# Patient Record
Sex: FEMALE | Race: WHITE | NOT HISPANIC OR LATINO
[De-identification: names, ages, dates, MRNs, and addresses within clinical notes are randomized per-mention and may not be internally consistent; named-entity substitution may affect disease eponyms.]

---

## 2020-09-08 ENCOUNTER — RESULT REVIEW (OUTPATIENT)
Age: 25
End: 2020-09-08

## 2020-12-22 PROBLEM — Z00.00 ENCOUNTER FOR PREVENTIVE HEALTH EXAMINATION: Status: ACTIVE | Noted: 2020-12-22

## 2021-01-05 ENCOUNTER — APPOINTMENT (OUTPATIENT)
Dept: SURGERY | Facility: CLINIC | Age: 26
End: 2021-01-05
Payer: COMMERCIAL

## 2021-01-05 VITALS
HEART RATE: 86 BPM | WEIGHT: 115 LBS | TEMPERATURE: 97.5 F | SYSTOLIC BLOOD PRESSURE: 124 MMHG | RESPIRATION RATE: 16 BRPM | BODY MASS INDEX: 24.14 KG/M2 | OXYGEN SATURATION: 99 % | DIASTOLIC BLOOD PRESSURE: 74 MMHG | HEIGHT: 58 IN

## 2021-01-05 DIAGNOSIS — Z78.9 OTHER SPECIFIED HEALTH STATUS: ICD-10-CM

## 2021-01-05 DIAGNOSIS — F17.200 NICOTINE DEPENDENCE, UNSPECIFIED, UNCOMPLICATED: ICD-10-CM

## 2021-01-05 PROCEDURE — 99243 OFF/OP CNSLTJ NEW/EST LOW 30: CPT

## 2021-01-05 PROCEDURE — 99072 ADDL SUPL MATRL&STAF TM PHE: CPT

## 2021-01-05 RX ORDER — NORETHINDRONE ACETATE AND ETHINYL ESTRADIOL 1MG-20(24)
1-20 KIT ORAL
Qty: 84 | Refills: 0 | Status: DISCONTINUED | COMMUNITY
Start: 2020-11-25

## 2021-01-05 RX ORDER — IPRATROPIUM BROMIDE 21 UG/1
0.03 SPRAY NASAL
Qty: 30 | Refills: 0 | Status: DISCONTINUED | COMMUNITY
Start: 2020-12-28

## 2021-01-05 NOTE — CONSULT LETTER
[Dear  ___] : Dear  [unfilled], [Consult Letter:] : I had the pleasure of evaluating your patient, [unfilled]. [Please see my note below.] : Please see my note below. [Consult Closing:] : Thank you very much for allowing me to participate in the care of this patient.  If you have any questions, please do not hesitate to contact me. [Sincerely,] : Sincerely, [FreeTextEntry3] : Prasad Grant M.D., F.A.C.S, F.A.S.C.R.S

## 2021-01-05 NOTE — HISTORY OF PRESENT ILLNESS
[FreeTextEntry1] : Chloe is a 24 y/o female here for evaluation of rectal pain. 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 Minimal grade I internal hemorrhoids. Pathology:  Colonic mucosa without significant changes. No active colitis, granulomata, diagnostic features of microscopic colitis, or dysplasia present. \par \par Today, patient reports daily rectal pain for the past year and a half. Pain is worsened after BM. Has soft BM daily. Has rectal bleeding with BM. She used Nitroglycerin with some relief. Denies prolapsing tissue.

## 2021-01-05 NOTE — ASSESSMENT
[FreeTextEntry1] : In summary the patient has a chronic anterior midline anal fissure which has failed to improve over the past year and a half with topical nitroglycerin.  I explained the treatment options include Botox versus an internal sphincterotomy.  I explained the risks benefits and alternatives of each approach including the risks of bleeding infection recurrence and the rare incidence of fecal incontinence with an internal sphincterotomy.  I explained that the risk of recurrence is least with an internal sphincterotomy.  She would like to proceed with surgery.  We will be scheduling this as soon as possible.

## 2021-01-05 NOTE — PHYSICAL EXAM
[Abdomen Tenderness] : ~T No ~M abdominal tenderness [Wheezing] : no wheezing was heard [No Rash or Lesion] : No rash or lesion [Alert] : alert [Calm] : calm [de-identified] : Perianal inspection reveals a chronic anterior midline anal fissure.  Anal rectal tone is increased.  There is no obvious fistula or abscess.  Anoscopy was deferred due to her level of discomfort. [de-identified] : nad [de-identified] : nl

## 2021-01-14 ENCOUNTER — OUTPATIENT (OUTPATIENT)
Dept: OUTPATIENT SERVICES | Facility: HOSPITAL | Age: 26
LOS: 1 days | End: 2021-01-14
Payer: COMMERCIAL

## 2021-01-14 VITALS
WEIGHT: 115.96 LBS | RESPIRATION RATE: 16 BRPM | HEIGHT: 59 IN | OXYGEN SATURATION: 97 % | SYSTOLIC BLOOD PRESSURE: 121 MMHG | TEMPERATURE: 98 F | HEART RATE: 82 BPM | DIASTOLIC BLOOD PRESSURE: 84 MMHG

## 2021-01-14 DIAGNOSIS — Z01.818 ENCOUNTER FOR OTHER PREPROCEDURAL EXAMINATION: ICD-10-CM

## 2021-01-14 DIAGNOSIS — K60.2 ANAL FISSURE, UNSPECIFIED: ICD-10-CM

## 2021-01-14 LAB
HCT VFR BLD CALC: 41.3 % — SIGNIFICANT CHANGE UP (ref 34.5–45)
HGB BLD-MCNC: 13.7 G/DL — SIGNIFICANT CHANGE UP (ref 11.5–15.5)
MCHC RBC-ENTMCNC: 31 PG — SIGNIFICANT CHANGE UP (ref 27–34)
MCHC RBC-ENTMCNC: 33.2 GM/DL — SIGNIFICANT CHANGE UP (ref 32–36)
MCV RBC AUTO: 93.4 FL — SIGNIFICANT CHANGE UP (ref 80–100)
NRBC # BLD: 0 /100 WBCS — SIGNIFICANT CHANGE UP (ref 0–0)
PLATELET # BLD AUTO: 257 K/UL — SIGNIFICANT CHANGE UP (ref 150–400)
RBC # BLD: 4.42 M/UL — SIGNIFICANT CHANGE UP (ref 3.8–5.2)
RBC # FLD: 11.9 % — SIGNIFICANT CHANGE UP (ref 10.3–14.5)
WBC # BLD: 4.77 K/UL — SIGNIFICANT CHANGE UP (ref 3.8–10.5)
WBC # FLD AUTO: 4.77 K/UL — SIGNIFICANT CHANGE UP (ref 3.8–10.5)

## 2021-01-14 PROCEDURE — 85027 COMPLETE CBC AUTOMATED: CPT

## 2021-01-14 PROCEDURE — G0463: CPT

## 2021-01-14 NOTE — H&P PST ADULT - NSICDXPROBLEM_GEN_ALL_CORE_FT
PROBLEM DIAGNOSES  Problem: Anal fissure  Assessment and Plan: Lateral interanal sphincterotomy  Labs- CBC  Pre op instructions discussed

## 2021-01-14 NOTE — H&P PST ADULT - HISTORY OF PRESENT ILLNESS
26 yo F with no significant PMH c/o rectal pain, pressure & bleeding x one year. Pt had surgical consult-anal fissure -scheduled for lateral internal  sphincterotomy on 1/20/2021    **Covid 19 PCR on 1/17/21

## 2021-01-14 NOTE — H&P PST ADULT - NEGATIVE GASTROINTESTINAL SYMPTOMS
Yari Shearer is a 65 Kim Kocher, PA-C  [facility]  Division of Otolaryngology    [facility] [address] [phone]  [facility] [phone]         no vomiting/no diarrhea/no constipation

## 2021-01-15 PROBLEM — K60.2 ANAL FISSURE, UNSPECIFIED: Chronic | Status: ACTIVE | Noted: 2021-01-14

## 2021-01-17 ENCOUNTER — OUTPATIENT (OUTPATIENT)
Dept: OUTPATIENT SERVICES | Facility: HOSPITAL | Age: 26
LOS: 1 days | End: 2021-01-17
Payer: COMMERCIAL

## 2021-01-17 DIAGNOSIS — Z20.828 CONTACT WITH AND (SUSPECTED) EXPOSURE TO OTHER VIRAL COMMUNICABLE DISEASES: ICD-10-CM

## 2021-01-17 LAB — SARS-COV-2 RNA SPEC QL NAA+PROBE: DETECTED

## 2021-01-17 PROCEDURE — U0003: CPT

## 2021-01-17 PROCEDURE — C9803: CPT

## 2021-01-17 PROCEDURE — U0005: CPT

## 2021-01-29 ENCOUNTER — OUTPATIENT (OUTPATIENT)
Dept: OUTPATIENT SERVICES | Facility: HOSPITAL | Age: 26
LOS: 1 days | End: 2021-01-29
Payer: COMMERCIAL

## 2021-01-29 DIAGNOSIS — Z11.52 ENCOUNTER FOR SCREENING FOR COVID-19: ICD-10-CM

## 2021-01-29 LAB — SARS-COV-2 RNA SPEC QL NAA+PROBE: SIGNIFICANT CHANGE UP

## 2021-01-29 PROCEDURE — C9803: CPT

## 2021-01-29 PROCEDURE — U0005: CPT

## 2021-01-29 PROCEDURE — U0003: CPT

## 2021-02-22 ENCOUNTER — OUTPATIENT (OUTPATIENT)
Dept: OUTPATIENT SERVICES | Facility: HOSPITAL | Age: 26
LOS: 1 days | End: 2021-02-22
Payer: COMMERCIAL

## 2021-02-22 DIAGNOSIS — Z11.52 ENCOUNTER FOR SCREENING FOR COVID-19: ICD-10-CM

## 2021-02-22 PROCEDURE — U0005: CPT

## 2021-02-22 PROCEDURE — U0003: CPT

## 2021-02-22 PROCEDURE — C9803: CPT

## 2021-02-23 LAB — SARS-COV-2 RNA SPEC QL NAA+PROBE: SIGNIFICANT CHANGE UP

## 2021-02-24 ENCOUNTER — TRANSCRIPTION ENCOUNTER (OUTPATIENT)
Age: 26
End: 2021-02-24

## 2021-02-25 ENCOUNTER — APPOINTMENT (OUTPATIENT)
Dept: SURGERY | Facility: HOSPITAL | Age: 26
End: 2021-02-25
Payer: COMMERCIAL

## 2021-02-25 ENCOUNTER — OUTPATIENT (OUTPATIENT)
Dept: OUTPATIENT SERVICES | Facility: HOSPITAL | Age: 26
LOS: 1 days | End: 2021-02-25
Payer: COMMERCIAL

## 2021-02-25 VITALS
HEART RATE: 74 BPM | DIASTOLIC BLOOD PRESSURE: 53 MMHG | TEMPERATURE: 98 F | RESPIRATION RATE: 16 BRPM | SYSTOLIC BLOOD PRESSURE: 104 MMHG | OXYGEN SATURATION: 100 %

## 2021-02-25 VITALS
WEIGHT: 115.96 LBS | HEART RATE: 88 BPM | DIASTOLIC BLOOD PRESSURE: 72 MMHG | TEMPERATURE: 98 F | OXYGEN SATURATION: 98 % | SYSTOLIC BLOOD PRESSURE: 103 MMHG | HEIGHT: 59 IN

## 2021-02-25 DIAGNOSIS — K60.2 ANAL FISSURE, UNSPECIFIED: ICD-10-CM

## 2021-02-25 PROCEDURE — 46080 SPHNCTROTMY ANAL DIV SPHNCTR: CPT

## 2021-02-25 RX ORDER — ONDANSETRON 8 MG/1
4 TABLET, FILM COATED ORAL ONCE
Refills: 0 | Status: DISCONTINUED | OUTPATIENT
Start: 2021-02-25 | End: 2021-03-12

## 2021-02-25 RX ORDER — SODIUM CHLORIDE 9 MG/ML
1000 INJECTION, SOLUTION INTRAVENOUS
Refills: 0 | Status: DISCONTINUED | OUTPATIENT
Start: 2021-02-25 | End: 2021-03-12

## 2021-02-25 RX ORDER — SODIUM CHLORIDE 9 MG/ML
3 INJECTION INTRAMUSCULAR; INTRAVENOUS; SUBCUTANEOUS EVERY 8 HOURS
Refills: 0 | Status: DISCONTINUED | OUTPATIENT
Start: 2021-02-25 | End: 2021-03-12

## 2021-02-25 RX ORDER — OXYCODONE HYDROCHLORIDE 5 MG/1
5 TABLET ORAL ONCE
Refills: 0 | Status: DISCONTINUED | OUTPATIENT
Start: 2021-02-25 | End: 2021-02-25

## 2021-02-25 RX ORDER — LIDOCAINE HCL 20 MG/ML
0.2 VIAL (ML) INJECTION ONCE
Refills: 0 | Status: DISCONTINUED | OUTPATIENT
Start: 2021-02-25 | End: 2021-03-12

## 2021-02-25 RX ORDER — NITROGLYCERIN 6.5 MG
0 CAPSULE, EXTENDED RELEASE ORAL
Qty: 0 | Refills: 0 | DISCHARGE

## 2021-02-25 RX ORDER — OXYCODONE 5 MG/1
5 TABLET ORAL
Qty: 20 | Refills: 0 | Status: COMPLETED | COMMUNITY
Start: 2021-02-25 | End: 2021-03-02

## 2021-02-25 NOTE — ASU DISCHARGE PLAN (ADULT/PEDIATRIC) - CARE PROVIDER_API CALL
Prasad Grant)  ColonRectal Surgery; Surgery  [address], [address]  [place] [address]  Phone: [phone]  Fax: [phone]  Follow Up Time: 1 week

## 2021-02-25 NOTE — ASU DISCHARGE PLAN (ADULT/PEDIATRIC) - CALL YOUR DOCTOR IF YOU HAVE ANY OF THE FOLLOWING:
Bleeding that does not stop/Swelling that gets worse/Pain not relieved by Medications/Wound/Surgical Site with redness, or foul smelling discharge or pus Bleeding that does not stop/Swelling that gets worse/Pain not relieved by Medications/Fever greater than (need to indicate Fahrenheit or Celsius)/Wound/Surgical Site with redness, or foul smelling discharge or pus/Nausea and vomiting that does not stop/Unable to urinate/Inability to tolerate liquids or foods

## 2021-02-25 NOTE — ASU PATIENT PROFILE, ADULT - PT NEEDS ASSIST
Per patient request, faxed Attending Physician's Statement, signed by A Betty HERNANDEZ, back to [employer] [facility] at fax [phone]. Received fax confirmation.   no

## 2021-02-25 NOTE — PRE-OP CHECKLIST - BP NONINVASIVE SYSTOLIC (MM HG)
No folate or iron deficiencies.   Is getting a bit too much vitamin B12.  Would recommend pt take her multivitamin every other day rather than daily (the multivitamin contains B12).   Follow up with her primary care as directed. 103

## 2021-02-26 DIAGNOSIS — K60.2 ANAL FISSURE, UNSPECIFIED: ICD-10-CM

## 2021-03-02 ENCOUNTER — APPOINTMENT (OUTPATIENT)
Dept: SURGERY | Facility: CLINIC | Age: 26
End: 2021-03-02
Payer: COMMERCIAL

## 2021-03-02 VITALS
HEART RATE: 102 BPM | DIASTOLIC BLOOD PRESSURE: 97 MMHG | SYSTOLIC BLOOD PRESSURE: 136 MMHG | RESPIRATION RATE: 15 BRPM | TEMPERATURE: 97.9 F | OXYGEN SATURATION: 99 %

## 2021-03-02 PROCEDURE — 99024 POSTOP FOLLOW-UP VISIT: CPT

## 2021-03-02 NOTE — ASSESSMENT
[FreeTextEntry1] : In summary the patient is postop day 5 status post left lateral internal sphincterotomy for an anterior midline anal fissure.  In the office today there is no evidence of infection/abscess.  I prescribed topical MetroGel and instructed her to continue fiber supplementation stool softeners and warm tub baths.  I will see her in 3 weeks for checkup or sooner if needed

## 2021-03-02 NOTE — HISTORY OF PRESENT ILLNESS
[FreeTextEntry1] : Chloe is a 26 y/o female here for post-operative visit. 2/25/21- lateral internal sphincterotomy. Today, patient reports increasing rectal discomfort.  She also feels a small lump to the left of her anus.  She denies fevers chills or rectal bleeding

## 2021-03-02 NOTE — PHYSICAL EXAM
[de-identified] : Perianal inspection reveals a healing incision in the left lateral position.  There is no evidence of fistula or abscess.  There is no perianal cellulitis.  The area is appropriately tender.  There is also a persistent anterior midline anal fissure

## 2021-04-06 ENCOUNTER — TRANSCRIPTION ENCOUNTER (OUTPATIENT)
Age: 26
End: 2021-04-06

## 2021-05-26 ENCOUNTER — NON-APPOINTMENT (OUTPATIENT)
Age: 26
End: 2021-05-26

## 2021-06-22 ENCOUNTER — APPOINTMENT (OUTPATIENT)
Dept: SURGERY | Facility: CLINIC | Age: 26
End: 2021-06-22

## 2021-06-29 ENCOUNTER — APPOINTMENT (OUTPATIENT)
Dept: OBGYN | Facility: CLINIC | Age: 26
End: 2021-06-29
Payer: COMMERCIAL

## 2021-06-29 VITALS
WEIGHT: 108 LBS | HEIGHT: 58 IN | TEMPERATURE: 97.8 F | BODY MASS INDEX: 22.67 KG/M2 | SYSTOLIC BLOOD PRESSURE: 124 MMHG | HEART RATE: 74 BPM | DIASTOLIC BLOOD PRESSURE: 72 MMHG | RESPIRATION RATE: 16 BRPM

## 2021-06-29 DIAGNOSIS — N94.6 DYSMENORRHEA, UNSPECIFIED: ICD-10-CM

## 2021-06-29 PROCEDURE — 99072 ADDL SUPL MATRL&STAF TM PHE: CPT

## 2021-06-29 PROCEDURE — 99203 OFFICE O/P NEW LOW 30 MIN: CPT

## 2021-06-29 NOTE — HISTORY OF PRESENT ILLNESS
[FreeTextEntry1] : Patient is a 25-year-old female who presents for a consultation and evaluation. Patient states that she has had a long history of painful periods which has become increasingly worse. Patient states her last 2 years the periods have become debilitating. Patient has been treated with birth control pills in the past and last had a pelvic sonogram at 2019. Patient gives a questionable history of ovarian cysts in the past. Discussed this issue with the patient in terms of ruling out endometriosis patient advised to have a pelvic MRI and then a followup consultation to discuss results and treatment options including laparoscopic surgery to diagnose and or treat endometriosis. Risks benefits and alternatives discussed with patient. Questions answered. Patient is agreeable. Patient also will be given a prescription for Ponstel and was advised to try CBD's

## 2021-06-29 NOTE — PHYSICAL EXAM
[Labia Majora] : normal [Labia Minora] : normal [Normal] : normal [Uterine Adnexae] : normal [FreeTextEntry5] : cculture done

## 2021-07-01 LAB
C TRACH RRNA SPEC QL NAA+PROBE: NOT DETECTED
N GONORRHOEA RRNA SPEC QL NAA+PROBE: NOT DETECTED
SOURCE AMPLIFICATION: NORMAL

## 2021-07-09 ENCOUNTER — APPOINTMENT (OUTPATIENT)
Dept: SURGERY | Facility: CLINIC | Age: 26
End: 2021-07-09
Payer: COMMERCIAL

## 2021-07-09 VITALS
SYSTOLIC BLOOD PRESSURE: 114 MMHG | OXYGEN SATURATION: 100 % | TEMPERATURE: 98.1 F | DIASTOLIC BLOOD PRESSURE: 77 MMHG | RESPIRATION RATE: 17 BRPM | HEART RATE: 85 BPM

## 2021-07-09 DIAGNOSIS — Z09 ENCOUNTER FOR FOLLOW-UP EXAMINATION AFTER COMPLETED TREATMENT FOR CONDITIONS OTHER THAN MALIGNANT NEOPLASM: ICD-10-CM

## 2021-07-09 PROCEDURE — 99213 OFFICE O/P EST LOW 20 MIN: CPT

## 2021-07-09 PROCEDURE — 99072 ADDL SUPL MATRL&STAF TM PHE: CPT

## 2021-07-09 RX ORDER — MULTIVITAMIN
TABLET ORAL
Refills: 0 | Status: ACTIVE | COMMUNITY

## 2021-07-09 RX ORDER — ASCORBIC ACID 500 MG
TABLET ORAL
Refills: 0 | Status: ACTIVE | COMMUNITY

## 2021-07-09 RX ORDER — ONDANSETRON 4 MG/1
4 TABLET ORAL EVERY 8 HOURS
Qty: 4 | Refills: 0 | Status: DISCONTINUED | COMMUNITY
Start: 2021-02-26 | End: 2021-07-09

## 2021-07-09 NOTE — PHYSICAL EXAM
[Abdomen Tenderness] : ~T No ~M abdominal tenderness [de-identified] : Perianal inspection digital rectal examination are normal except for a tiny left lateral skin tag in the left lateral position at the site of her previous sphincterotomy there is no residual fissure fistula or abscess

## 2021-07-09 NOTE — PHYSICAL EXAM
[Abdomen Tenderness] : ~T No ~M abdominal tenderness [de-identified] : Perianal inspection digital rectal examination are normal except for a tiny left lateral skin tag in the left lateral position at the site of her previous sphincterotomy there is no residual fissure fistula or abscess

## 2021-07-09 NOTE — HISTORY OF PRESENT ILLNESS
[FreeTextEntry1] : Chloe is a 26 y/o female here for post-operative visit. 2/25/21- lateral internal sphincterotomy. She was last seen on 03/02/21, there was no evidence of  infection/abscess. Prescribed topical MetroGel and instructed her to continue fiber supplementation stool softeners and warm tub baths. \par \par Today pt reports feeling well, no pain. Pt reports 1 formed BM daily, no pain, no rectal bleeding. No episodes of incontinence. Pt reports feeling external tissue that causes itching and discomfort intermittently throughout the day. Pt reports good appetite, no nausea, no vomiting. Pt not on anticoagulants.  Her fissure pain has subsided.

## 2021-07-09 NOTE — ASSESSMENT
[FreeTextEntry1] : In summary the patient has a small symptomatic left lateral skin tag in the area of her previous internal sphincterotomy.  Her fissure has healed.  She has tried conservative treatment with topical hydrocortisone and her symptoms have not improved.  I therefore recommended excision of her tag in the office under local anesthesia.  I explained the risks benefits and alternatives.

## 2021-07-14 ENCOUNTER — APPOINTMENT (OUTPATIENT)
Dept: MRI IMAGING | Facility: CLINIC | Age: 26
End: 2021-07-14
Payer: COMMERCIAL

## 2021-07-14 ENCOUNTER — OUTPATIENT (OUTPATIENT)
Dept: OUTPATIENT SERVICES | Facility: HOSPITAL | Age: 26
LOS: 1 days | End: 2021-07-14
Payer: COMMERCIAL

## 2021-07-14 DIAGNOSIS — N94.6 DYSMENORRHEA, UNSPECIFIED: ICD-10-CM

## 2021-07-14 PROCEDURE — 72197 MRI PELVIS W/O & W/DYE: CPT

## 2021-07-14 PROCEDURE — 72197 MRI PELVIS W/O & W/DYE: CPT | Mod: 26

## 2021-07-14 PROCEDURE — A9585: CPT

## 2021-07-20 ENCOUNTER — NON-APPOINTMENT (OUTPATIENT)
Age: 26
End: 2021-07-20

## 2021-07-22 ENCOUNTER — RX RENEWAL (OUTPATIENT)
Age: 26
End: 2021-07-22

## 2021-07-22 RX ORDER — MEFENAMIC ACID 250 MG/1
250 CAPSULE ORAL EVERY 6 HOURS
Qty: 60 | Refills: 0 | Status: ACTIVE | COMMUNITY
Start: 2021-06-29 | End: 1900-01-01

## 2021-07-26 ENCOUNTER — APPOINTMENT (OUTPATIENT)
Dept: OBGYN | Facility: CLINIC | Age: 26
End: 2021-07-26
Payer: COMMERCIAL

## 2021-07-26 DIAGNOSIS — N94.6 DYSMENORRHEA, UNSPECIFIED: ICD-10-CM

## 2021-07-26 DIAGNOSIS — R10.2 PELVIC AND PERINEAL PAIN: ICD-10-CM

## 2021-07-26 PROCEDURE — 99072 ADDL SUPL MATRL&STAF TM PHE: CPT

## 2021-07-26 PROCEDURE — 99214 OFFICE O/P EST MOD 30 MIN: CPT

## 2021-07-26 NOTE — COUNSELING
[Nutrition/ Exercise/ Weight Management] : nutrition, exercise, weight management [Vitamins/Supplements] : vitamins/supplements [Lab Results] : lab results [Medication Management] : medication management

## 2021-07-26 NOTE — HISTORY OF PRESENT ILLNESS
[FreeTextEntry1] : Patient is a [age] female presents for a consultation to discuss test results and treatment options. Patient with complaints of worsening dysmenorrhea and pelvic discomfort over the past year. Patient has been on birth control pills in the past patient does not want to start OCPs at this time. Pelvic MRI done recently was within normal limits. Discussed options with patient including restarting oral contraceptives or other hormonal treatment versus diagnostic laparoscopic surgery. Suggested robotically assisted laparoscopy with ICG and possible resection of endometriosis. Risks benefits alternatives discussed with the patient and questions answered. Patient states she understands and would like to consider her options. Instructions and precautions reviewed\par \par 30 minutes of face time

## 2021-08-10 ENCOUNTER — APPOINTMENT (OUTPATIENT)
Dept: SURGERY | Facility: CLINIC | Age: 26
End: 2021-08-10
Payer: COMMERCIAL

## 2021-08-10 VITALS
WEIGHT: 108 LBS | OXYGEN SATURATION: 96 % | BODY MASS INDEX: 21.77 KG/M2 | HEIGHT: 59 IN | DIASTOLIC BLOOD PRESSURE: 74 MMHG | RESPIRATION RATE: 18 BRPM | SYSTOLIC BLOOD PRESSURE: 107 MMHG | TEMPERATURE: 97.9 F | HEART RATE: 81 BPM

## 2021-08-10 DIAGNOSIS — K64.4 RESIDUAL HEMORRHOIDAL SKIN TAGS: ICD-10-CM

## 2021-08-10 PROCEDURE — 46220 EXCISE ANAL EXT TAG/PAPILLA: CPT

## 2021-08-10 RX ORDER — METRONIDAZOLE 10 MG/G
1 GEL TOPICAL TWICE DAILY
Qty: 1 | Refills: 3 | Status: DISCONTINUED | COMMUNITY
Start: 2021-03-01 | End: 2021-08-10

## 2021-08-10 RX ORDER — DEXTROAMPHETAMINE SACCHARATE, AMPHETAMINE ASPARTATE MONOHYDRATE, DEXTROAMPHETAMINE SULFATE AND AMPHETAMINE SULFATE 2.5; 2.5; 2.5; 2.5 MG/1; MG/1; MG/1; MG/1
10 CAPSULE, EXTENDED RELEASE ORAL
Qty: 30 | Refills: 0 | Status: ACTIVE | COMMUNITY
Start: 2021-07-15

## 2021-08-10 NOTE — PHYSICAL EXAM
[Abdomen Tenderness] : ~T No ~M abdominal tenderness [de-identified] : Perianal inspection reveals a small skin tag in the left lateral position.  There is no fissure fistula or abscess.  After the rare complications of bleeding and infection were explained the tag was excised under sterile conditions using 3 cc of half percent Marcaine with 1% lidocaine.  Hemostasis was achieved with Monsel solution a sterile dressing was applied and the specimen was sent to pathology for permanent examination.

## 2021-08-10 NOTE — HISTORY OF PRESENT ILLNESS
[FreeTextEntry1] : Chloe is a 25 year old female here excision of skin tag. \par \par s/p left lateral internal sphincterotomy 2/25/21. \par \par Last seen 7/9/21- In summary the patient has a small symptomatic left lateral skin tag in the area of her previous internal sphincterotomy. Her fissure has healed. She has tried conservative treatment with topical hydrocortisone and her symptoms have not improved. I therefore recommended excision of her tag in the office under local anesthesia. I explained the risks benefits and alternatives. \par \par Today pt reports feeling well, no pain. Pt reports 1 formed BM daily, no pain, no rectal bleeding. No episodes of incontinence. External tissue that causes itching and discomfort intermittently throughout the day.Pt reports good appetite, no nausea, no vomiting. Pt not on anticoagulants.

## 2021-08-10 NOTE — ASSESSMENT
[FreeTextEntry1] : In summary the patient had a small mildly symptomatic left lateral anal tag which was excised under local anesthesia in the office.  She tolerated the procedure well I instructed her to keep the area clean, avoid constipation and follow-up with me as needed.

## 2021-08-17 ENCOUNTER — APPOINTMENT (OUTPATIENT)
Dept: OBGYN | Facility: CLINIC | Age: 26
End: 2021-08-17

## 2021-08-22 LAB — CORE LAB BIOPSY: NORMAL

## 2022-02-28 ENCOUNTER — NON-APPOINTMENT (OUTPATIENT)
Age: 27
End: 2022-02-28

## 2022-03-25 ENCOUNTER — PREPPED CHART (OUTPATIENT)
Dept: URBAN - METROPOLITAN AREA CLINIC 38 | Facility: CLINIC | Age: 27
End: 2022-03-25

## 2022-05-27 ENCOUNTER — TRANSCRIPTION ENCOUNTER (OUTPATIENT)
Age: 27
End: 2022-05-27

## 2022-10-14 NOTE — H&P PST ADULT - I HAVE PERSONALLY SEEN AND EXAMINED THE PATIENT. THERE HAVE NOT BEEN ANY CHANGES IN THE PATIENT'S HISTORY OR EXAM UNLESS COMMENTED BELOW
[facility] - ICU Hospital Medicine  Progress Note        Patient Name: Adrienne Urbina  Age: 65 y.o.  : 1957  MRN: [id number]  Admission Date: 10/10/2022  Hospital Length of Stay: 4 days  Code Status: Full Code  Attending Provider: Joseluis Moseley MD  Primary Care Provider: Gregorio Cherry NP   Principal Problem: Severe sepsis      Subjective:     HPI: 65 years old  female past medical history of stage IV lung cancer with metastasis to the brain, chronic hypoxic respiratory failure due to COPD, AFib, hypertension was brought to the hospital via EMS.    Patient herself is a poor historian, she is weak she has fatigue she is tired and her daughter provides majority of the information.    Patient had chemotherapy about 2 weeks ago last time.  She follows up with [facility].  In the past patient had brain metastasis treated but per family member they are very small at this time and there are not going to do anything with it other than continue current chemotherapy   Patient at home uses 2 L of oxygen during the night only, she uses nebulized treatment, she is on long term prednisone for her COPD.    Patient follows up with Cardiology as well for AFib.  She is on aspirin as a blood thinner and metoprolol but due to her low blood pressure she has not been able to take it.    On arrival to emergency room noticed to be tachycardic, hypoxic, tachypneic and low blood pressure.  Her blood work shows anemia.  Patient has been coughing greenish and sometime she has blood in the sputum.    Admitted to the hospital as an inpatient with severe sepsis most likely due to compressive pneumonia.    Hospital/ICU Course: 10/11  Feels better today. Hasnt been OOB. Dyspnea improved. Cough remains productive. Sputum sent for Cx. On Levaquin. Hgb 7.0, 2u PRBC ordered. K+ 3.4, replacing.     10/12  Transfused 2u PRBC yest, Hgb 9.7 from 7.0. Cr 1.04 from 0.88 and 0.81. Alb 1.7. States she feels  slightly better today. Still with productive cough. Got OOB and sat in recliner about 2h today. States decent PO intake. Denies any new complaints. Had a very long discussion with pt and her daughters in room about poor overall prognosis and end-of-life issues/planning. Pt appears receptive to idea of hospice but daughters are visibly uncomfortable addressing likelihood of pt deteriorating in very near future and avoid even discussing hospice at this time. Pt became very tachycardic today with -180's. Lopressor 5mg IV minimally effective. Digoxin 0.125mg IV ineffective. Cardizem 10mg IV ordered currently and awaiting response.     10/13  HR controlled in ICU. Feels well today and breathing comfortably. Followed up on EOL wishes/care discussion from yesterday and asked her thoughts and her family's thoughts. She is still unsure but does state she wishes to die at home. She called her daughter and we spoke to her on speakerphone. She expresses an overall tone of denial pertaining to her mothers advancing terminal condition and avoidant of any talk or plans other than aggressive medical treatment. There is a palpable feeling of reluctant submissiveness by the patient to her daughter. Code status was later discussed alone with pt and she states that she wishes to remain FULL CODE. Thought pts condition is improved somewhat from presentation, it is felt that pt very unlikely to improve much more significantly from current status. If she is able to progress enough to leave hospital and cont to choose continued aggressive medical interventions, her likelihood of readmission soon afterwards is very high.   - WBC 16.5 cont to trend upwards despite improvement in CRP of 106.7 from >240. Chem panel rather normal x Albumin 1.6. SputumCx prelim MANY GNR's; currently on Levaquin/Azithro. CXR today unchanged.    10/14  No new complaints. Feels the same. On Cardizem gtt higher rate. Seen by Cardiology and transitioning to PO  Cardizem and Increasing Metoprolol. Spoke with pts daughters at length about current condition, prognosis and EOL issues. Their expectations are for pt to go home after PNA tx'd. Will contact pts Oncologist,  in [place].      Review of Systems: 10 systems reviewed all pertinent positives and negatives stated in HPI, otherwise negative.        Objective:     Vital Signs (Most Recent):  Temp: 99 °F (37.2 °C) (10/14/22 1600)  Pulse: 90 (10/14/22 1615)  Resp: (!) 21 (10/14/22 1615)  BP: 116/64 (10/14/22 1615)  SpO2: 97 % (10/14/22 1615)   Vital Signs (24h Range):  Temp:  [96.6 °F (35.9 °C)-99 °F (37.2 °C)] 99 °F (37.2 °C)  Pulse:  [] 90  Resp:  [4-45] 21  SpO2:  [79 %-100 %] 97 %  BP: ()/(56-98) 116/64     Weight: 76.3 kg (168 lb 3.4 oz)  Body mass index is 27.15 kg/m².      Intake/Output Summary (Last 24 hours) at 10/14/2022 1721  Last data filed at 10/14/2022 1255  Gross per 24 hour   Intake 660 ml   Output 700 ml   Net -40 ml         Physical Exam  Constitutional:       General: She is not in acute distress.     Appearance: She is ill-appearing. She is not toxic-appearing.   HENT:      Head: Normocephalic and atraumatic.      Nose: Nose normal.      Mouth/Throat:      Mouth: Mucous membranes are moist.      Pharynx: Oropharynx is clear.   Eyes:      Extraocular Movements: Extraocular movements intact.      Conjunctiva/sclera: Conjunctivae normal.      Pupils: Pupils are equal, round, and reactive to light.   Cardiovascular:      Rate and Rhythm: Regular rhythm. Tachycardia present.      Heart sounds: No murmur heard.  Pulmonary:      Effort: Pulmonary effort is normal. No respiratory distress.      Breath sounds: Rhonchi and rales present. No wheezing.   Abdominal:      General: Bowel sounds are normal.      Palpations: Abdomen is soft.      Tenderness: There is no abdominal tenderness.   Musculoskeletal:         General: Normal range of motion.      Cervical back: Normal range of motion.       Right lower leg: No edema.      Left lower leg: No edema.   Skin:     General: Skin is warm and dry.   Neurological:      General: No focal deficit present.      Mental Status: She is alert and oriented to person, place, and time. Mental status is at baseline.   Psychiatric:         Mood and Affect: Mood normal.         Behavior: Behavior normal.         Thought Content: Thought content normal.         Judgment: Judgment normal.         Vent Settings:  Oxygen Concentration (%): 28 (10/12/22 1903)    Lines/Drains/Airways       Central Venous Catheter Line  Duration             Port A Cath Single Lumen right subclavian -- days              Peripheral Intravenous Line  Duration                  Peripheral IV - Single Lumen 10/12/22 2040 22 G Anterior;Left Wrist 1 day         Peripheral IV - Single Lumen 10/14/22 0300 20 G Right Antecubital <1 day                      Significant Labs:  CBC/Anemia Profile:  Recent Labs   Lab 10/13/22  0306 10/14/22  0410   WBC 16.5* 10.8   HGB 11.0* 12.1   HCT 36.4* 40.6   * 486*   MCV 94.5* 96.7*   RDW 20.1* 20.6*        Chemistries:  Recent Labs   Lab 10/13/22  0306 10/14/22  0410    136   K 4.4 5.0   CO2 25 30   BUN 17.0 19.0   CREATININE 0.82 0.92   CALCIUM 9.4 9.6   ALBUMIN 1.6* 1.7*   BILITOT  --  0.4   ALKPHOS  --  160*   ALT  --  11   AST  --  13   GLUCOSE 65* 125*   MG 1.60  --    PHOS 2.7  --      Microbiology:  Microbiology Results (last 7 days)       Procedure Component Value Units Date/Time    Blood Culture x two cultures. Draw prior to antibiotics [[id number]]  (Normal) Collected: 10/10/22 1620    Order Status: Completed Specimen: Blood from Antecubital, Left Updated: 10/14/22 1100     CULTURE, BLOOD ([facility]) No Growth At 72 Hours    Blood Culture [[id number]]  (Normal) Collected: 10/10/22 1623    Order Status: Completed Specimen: Blood from Hand, Right Updated: 10/14/22 1100     CULTURE, BLOOD ([facility]) No Growth At 72 Hours    Respiratory Culture [[id number]]   (Abnormal)  (Susceptibility) Collected: 10/11/22 1210    Order Status: Completed Specimen: Sputum, Expectorated Updated: 10/14/22 1055     Respiratory Culture Many Serratia marcescens     GRAM STAIN Quality 3+      Many Gram Negative Rods    Serratia marcescens    Antibiotic Interpretation Value     Amox/K Clav Resistant >=32   Cefazolin Resistant >=64   Cefepime Sensitive 0.5   Ceftriaxone Sensitive 1   Cefuroxime Resistant >=64   Ciprofloxacin Resistant >=4   Gentamicin Sensitive <=1   Levofloxacin Resistant >=8   Meropenem Sensitive <=0.25   Trimethoprim/Sulfamethoxazole Sensitive <=20   Tetracycline Resistant >=16   Tobramycin Sensitive           Mycobacteria and Nocardia Culture [[id number]] Collected: 10/11/22 1235    Order Status: Sent Updated: 10/12/22 1126    Blood Culture x two cultures. Draw prior to antibiotics [[id number]]     Order Status: Canceled Specimen: Blood              All pertinent labs within the past 24 hours have been reviewed.      Significant Imaging:  I have reviewed all pertinent imaging results/findings within the past 24 hours.        MAR (current):  Scheduled Meds:   aspirin  81 mg Oral Daily    azithromycin  500 mg Intravenous Q24H    dicyclomine  10 mg Oral TID    diltiaZEM  360 mg Oral Daily    famotidine (PF)  20 mg Intravenous BID    gabapentin  300 mg Oral TID    guaiFENesin  600 mg Oral BID    levoFLOXacin  750 mg Intravenous Q24H    methylPREDNISolone sodium succinate injection  60 mg Intravenous Q12H    metoprolol tartrate  25 mg Oral BID    mirtazapine  15 mg Oral QHS    montelukast  10 mg Oral Daily    mupirocin   Nasal BID    polyethylene glycol  17 g Oral Daily     Continuous Infusions:   dilTIAZem 10 mg/hr (10/14/22 0457)     PRN Meds:   sodium chloride    acetaminophen    ALPRAZolam    aluminum-magnesium hydroxide-simethicone    guaiFENesin-codeine 100-10 mg/5 ml    HYDROcodone-acetaminophen    naloxone    ondansetron    sodium chloride 0.9%    sodium chloride 0.9%         Assessment/Plan:       Severe sepsis    Acute on chronic respiratory failure    Pneumonia due to infectious organism    Stage IV adenocarcinoma of lung    Brain metastases    Cavitary lesion of lung    Symptomatic anemia    COPD (chronic obstructive pulmonary disease)    Chronic a-fib    Anxiety and depression      - ICU care  - Cardizem gtt IV  - Cardiology recs  - PO Metoprolol  - s/p 2u PRBC  - am labs  - Levaquin/Azithro stopped  - Meropenem started  - sputum CX grew Serratia marcescens       Critical Care Time: 35 minutes       VTE Risk Mitigation (From admission, onward)           Ordered     Reason for no Mechanical VTE Prophylaxis  Once        Question:  Reasons:  Answer:  Active Bleeding    10/10/22 1555     Place ASHLYN hose  Until discontinued         10/10/22 1555     IP VTE HIGH RISK PATIENT  Once         10/10/22 1555     Place sequential compression device  Until discontinued         10/10/22 1555     Place sequential compression device  Until discontinued         10/10/22 1505                       Joseluis Moseley MD  [facility]  [facility] - ICU    Statement Selected

## 2025-05-08 NOTE — PRE-ANESTHESIA EVALUATION ADULT - NSANTHAPLANRD_GEN_ALL_CORE
Received denial letter from [employer] for Ozempic 8mg.     Reason of denial: (quantity limit) must have had a poor response to your current dosage before switching to a higher strength or must have a side effect or allergy to your current dose before switching to a lower strength.     Spoke to Rep explaining it is too early to up the dosage and if there is a reasoning to up the dosage, then write an appeal letter.     Rep stated it must passed 180 days to up the dosage, it has only been 62 days. Patient will have to wait until August or September to up the dosage.     Patient is also seeing Endocrinology that prescribed Ozempic 4mg 03/23/2025.     Route message to provider.  SALINA CHAPMAN    monitored anesthesia care (MAC)
